# Patient Record
Sex: FEMALE | Race: WHITE
[De-identification: names, ages, dates, MRNs, and addresses within clinical notes are randomized per-mention and may not be internally consistent; named-entity substitution may affect disease eponyms.]

---

## 2017-05-25 ENCOUNTER — RESULT REVIEW (OUTPATIENT)
Age: 22
End: 2017-05-25

## 2018-05-29 ENCOUNTER — EMERGENCY (EMERGENCY)
Facility: HOSPITAL | Age: 23
LOS: 1 days | Discharge: HOME | End: 2018-05-29
Attending: EMERGENCY MEDICINE | Admitting: EMERGENCY MEDICINE
Payer: COMMERCIAL

## 2018-05-29 VITALS
OXYGEN SATURATION: 100 % | DIASTOLIC BLOOD PRESSURE: 78 MMHG | TEMPERATURE: 99 F | RESPIRATION RATE: 16 BRPM | HEART RATE: 76 BPM | SYSTOLIC BLOOD PRESSURE: 124 MMHG

## 2018-05-29 VITALS
SYSTOLIC BLOOD PRESSURE: 120 MMHG | HEART RATE: 108 BPM | OXYGEN SATURATION: 99 % | RESPIRATION RATE: 18 BRPM | TEMPERATURE: 99 F | DIASTOLIC BLOOD PRESSURE: 72 MMHG

## 2018-05-29 LAB
ALBUMIN SERPL ELPH-MCNC: 4 G/DL — SIGNIFICANT CHANGE UP (ref 3.3–5)
ALP SERPL-CCNC: 62 U/L — SIGNIFICANT CHANGE UP (ref 40–120)
ALT FLD-CCNC: 13 U/L — SIGNIFICANT CHANGE UP (ref 4–33)
ANISOCYTOSIS BLD QL: SLIGHT — SIGNIFICANT CHANGE UP
APPEARANCE UR: SIGNIFICANT CHANGE UP
AST SERPL-CCNC: 15 U/L — SIGNIFICANT CHANGE UP (ref 4–32)
BACTERIA # UR AUTO: SIGNIFICANT CHANGE UP
BASE EXCESS BLDV CALC-SCNC: 2.5 MMOL/L — SIGNIFICANT CHANGE UP
BASOPHILS # BLD AUTO: 0.01 K/UL — SIGNIFICANT CHANGE UP (ref 0–0.2)
BASOPHILS NFR BLD AUTO: 0.1 % — SIGNIFICANT CHANGE UP (ref 0–2)
BASOPHILS NFR SPEC: 0 % — SIGNIFICANT CHANGE UP (ref 0–2)
BILIRUB SERPL-MCNC: 0.5 MG/DL — SIGNIFICANT CHANGE UP (ref 0.2–1.2)
BILIRUB UR-MCNC: NEGATIVE — SIGNIFICANT CHANGE UP
BLOOD GAS VENOUS - CREATININE: 0.63 MG/DL — SIGNIFICANT CHANGE UP (ref 0.5–1.3)
BLOOD UR QL VISUAL: HIGH
BUN SERPL-MCNC: 5 MG/DL — LOW (ref 7–23)
CALCIUM SERPL-MCNC: 8.7 MG/DL — SIGNIFICANT CHANGE UP (ref 8.4–10.5)
CHLORIDE BLDV-SCNC: 102 MMOL/L — SIGNIFICANT CHANGE UP (ref 96–108)
CHLORIDE SERPL-SCNC: 95 MMOL/L — LOW (ref 98–107)
CO2 SERPL-SCNC: 23 MMOL/L — SIGNIFICANT CHANGE UP (ref 22–31)
COLOR SPEC: YELLOW — SIGNIFICANT CHANGE UP
CREAT SERPL-MCNC: 0.78 MG/DL — SIGNIFICANT CHANGE UP (ref 0.5–1.3)
EOSINOPHIL # BLD AUTO: 0.01 K/UL — SIGNIFICANT CHANGE UP (ref 0–0.5)
EOSINOPHIL NFR BLD AUTO: 0.1 % — SIGNIFICANT CHANGE UP (ref 0–6)
EOSINOPHIL NFR FLD: 0 % — SIGNIFICANT CHANGE UP (ref 0–6)
GAS PNL BLDV: 131 MMOL/L — LOW (ref 136–146)
GIANT PLATELETS BLD QL SMEAR: PRESENT — SIGNIFICANT CHANGE UP
GLUCOSE BLDV-MCNC: 117 — HIGH (ref 70–99)
GLUCOSE SERPL-MCNC: 114 MG/DL — HIGH (ref 70–99)
GLUCOSE UR-MCNC: NEGATIVE — SIGNIFICANT CHANGE UP
HCO3 BLDV-SCNC: 25 MMOL/L — SIGNIFICANT CHANGE UP (ref 20–27)
HCT VFR BLD CALC: 41.4 % — SIGNIFICANT CHANGE UP (ref 34.5–45)
HCT VFR BLDV CALC: 46.4 % — HIGH (ref 34.5–45)
HGB BLD-MCNC: 14 G/DL — SIGNIFICANT CHANGE UP (ref 11.5–15.5)
HGB BLDV-MCNC: 15.1 G/DL — SIGNIFICANT CHANGE UP (ref 11.5–15.5)
IMM GRANULOCYTES # BLD AUTO: 0.04 # — SIGNIFICANT CHANGE UP
IMM GRANULOCYTES NFR BLD AUTO: 0.5 % — SIGNIFICANT CHANGE UP (ref 0–1.5)
KETONES UR-MCNC: NEGATIVE — SIGNIFICANT CHANGE UP
LACTATE BLDV-MCNC: 1.5 MMOL/L — SIGNIFICANT CHANGE UP (ref 0.5–2)
LEUKOCYTE ESTERASE UR-ACNC: NEGATIVE — SIGNIFICANT CHANGE UP
LYMPHOCYTES # BLD AUTO: 0.69 K/UL — LOW (ref 1–3.3)
LYMPHOCYTES # BLD AUTO: 8.4 % — LOW (ref 13–44)
LYMPHOCYTES NFR SPEC AUTO: 6.4 % — LOW (ref 13–44)
MACROCYTES BLD QL: SLIGHT — SIGNIFICANT CHANGE UP
MCHC RBC-ENTMCNC: 31 PG — SIGNIFICANT CHANGE UP (ref 27–34)
MCHC RBC-ENTMCNC: 33.8 % — SIGNIFICANT CHANGE UP (ref 32–36)
MCV RBC AUTO: 91.6 FL — SIGNIFICANT CHANGE UP (ref 80–100)
MONOCYTES # BLD AUTO: 0.62 K/UL — SIGNIFICANT CHANGE UP (ref 0–0.9)
MONOCYTES NFR BLD AUTO: 7.6 % — SIGNIFICANT CHANGE UP (ref 2–14)
MONOCYTES NFR BLD: 7.3 % — SIGNIFICANT CHANGE UP (ref 2–9)
MUCOUS THREADS # UR AUTO: SIGNIFICANT CHANGE UP
NEUTROPHIL AB SER-ACNC: 77.1 % — HIGH (ref 43–77)
NEUTROPHILS # BLD AUTO: 6.83 K/UL — SIGNIFICANT CHANGE UP (ref 1.8–7.4)
NEUTROPHILS NFR BLD AUTO: 83.3 % — HIGH (ref 43–77)
NEUTS BAND # BLD: 8.3 % — HIGH (ref 0–6)
NITRITE UR-MCNC: NEGATIVE — SIGNIFICANT CHANGE UP
NON-SQ EPI CELLS # UR AUTO: <1 — SIGNIFICANT CHANGE UP
NRBC # FLD: 0 — SIGNIFICANT CHANGE UP
PCO2 BLDV: 44 MMHG — SIGNIFICANT CHANGE UP (ref 41–51)
PH BLDV: 7.4 PH — SIGNIFICANT CHANGE UP (ref 7.32–7.43)
PH UR: 6 — SIGNIFICANT CHANGE UP (ref 4.6–8)
PLATELET # BLD AUTO: 228 K/UL — SIGNIFICANT CHANGE UP (ref 150–400)
PLATELET COUNT - ESTIMATE: NORMAL — SIGNIFICANT CHANGE UP
PMV BLD: 10 FL — SIGNIFICANT CHANGE UP (ref 7–13)
PO2 BLDV: < 24 MMHG — LOW (ref 35–40)
POTASSIUM BLDV-SCNC: 3.8 MMOL/L — SIGNIFICANT CHANGE UP (ref 3.4–4.5)
POTASSIUM SERPL-MCNC: 4.1 MMOL/L — SIGNIFICANT CHANGE UP (ref 3.5–5.3)
POTASSIUM SERPL-SCNC: 4.1 MMOL/L — SIGNIFICANT CHANGE UP (ref 3.5–5.3)
PROT SERPL-MCNC: 7.4 G/DL — SIGNIFICANT CHANGE UP (ref 6–8.3)
PROT UR-MCNC: 20 MG/DL — SIGNIFICANT CHANGE UP
RBC # BLD: 4.52 M/UL — SIGNIFICANT CHANGE UP (ref 3.8–5.2)
RBC # FLD: 11.8 % — SIGNIFICANT CHANGE UP (ref 10.3–14.5)
RBC CASTS # UR COMP ASSIST: SIGNIFICANT CHANGE UP (ref 0–?)
REVIEW TO FOLLOW: YES — SIGNIFICANT CHANGE UP
SAO2 % BLDV: 27.4 % — LOW (ref 60–85)
SODIUM SERPL-SCNC: 132 MMOL/L — LOW (ref 135–145)
SP GR SPEC: 1.01 — SIGNIFICANT CHANGE UP (ref 1–1.04)
SQUAMOUS # UR AUTO: SIGNIFICANT CHANGE UP
UROBILINOGEN FLD QL: NORMAL MG/DL — SIGNIFICANT CHANGE UP
VARIANT LYMPHS # BLD: 0.9 % — SIGNIFICANT CHANGE UP
WBC # BLD: 8.2 K/UL — SIGNIFICANT CHANGE UP (ref 3.8–10.5)
WBC # FLD AUTO: 8.2 K/UL — SIGNIFICANT CHANGE UP (ref 3.8–10.5)
WBC UR QL: HIGH (ref 0–?)

## 2018-05-29 PROCEDURE — 93010 ELECTROCARDIOGRAM REPORT: CPT

## 2018-05-29 PROCEDURE — 99285 EMERGENCY DEPT VISIT HI MDM: CPT | Mod: 25

## 2018-05-29 RX ORDER — SODIUM CHLORIDE 9 MG/ML
2000 INJECTION INTRAMUSCULAR; INTRAVENOUS; SUBCUTANEOUS ONCE
Qty: 0 | Refills: 0 | Status: COMPLETED | OUTPATIENT
Start: 2018-05-29 | End: 2018-05-29

## 2018-05-29 RX ORDER — ONDANSETRON 8 MG/1
4 TABLET, FILM COATED ORAL ONCE
Qty: 0 | Refills: 0 | Status: COMPLETED | OUTPATIENT
Start: 2018-05-29 | End: 2018-05-29

## 2018-05-29 RX ADMIN — ONDANSETRON 4 MILLIGRAM(S): 8 TABLET, FILM COATED ORAL at 10:15

## 2018-05-29 RX ADMIN — SODIUM CHLORIDE 2000 MILLILITER(S): 9 INJECTION INTRAMUSCULAR; INTRAVENOUS; SUBCUTANEOUS at 09:24

## 2018-05-29 NOTE — ED PROVIDER NOTE - CARE PLAN
Principal Discharge DX:	Syncope  Assessment and plan of treatment:	1) Please follow-up with your primary care doctor within the next 3 days.  If you cannot follow-up with your doctor(s), please return to the ED for any urgent issues.  2) If you have any worsening of symptoms or any other concerns please return to the ED immediately.  3) Please continue taking your home medications as directed.  4) You may have been given a copy of your labs and/or imaging.  Please go over these with your primary care doctor.

## 2018-05-29 NOTE — ED ADULT TRIAGE NOTE - CHIEF COMPLAINT QUOTE
Recently returned from \[facility]\"" on May 22 and started experiencing nausea, diarrhea and abdominal pain.  Seen at urgent care on Wednesday and given antibiotics without relief.  She felt dizzy this morning and had a syncopal episode.  Patient felt dizzy and she remember waking up on the floor.  Bruise noted to forehead.

## 2018-05-29 NOTE — ED PROVIDER NOTE - PLAN OF CARE
1) Please follow-up with your primary care doctor within the next 3 days.  If you cannot follow-up with your doctor(s), please return to the ED for any urgent issues.  2) If you have any worsening of symptoms or any other concerns please return to the ED immediately.  3) Please continue taking your home medications as directed.  4) You may have been given a copy of your labs and/or imaging.  Please go over these with your primary care doctor.

## 2018-05-29 NOTE — ED PROVIDER NOTE - MEDICAL DECISION MAKING DETAILS
22F otherwise healthy s/p syncopal episode this AM. Well-appearing, neuro intact, not able to appreciate any signs of trauma. No hemotympanum, no carr sign. Syncope likely 2/2 dehydration vs vasovagal, will obtain labs, hydrate, treat nausea, check hcg, ekg.

## 2018-05-29 NOTE — ED PROVIDER NOTE - PROGRESS NOTE DETAILS
Spoke to pt and father about risks/benefit of cat scanning her head. Explained given her mechanism and perfect neuro exam, explained scan is likely low utility. Both pt and daughter have expressed their understanding. Spoke to pt and father about risks/benefit of cat scanning her head. Explained given her mechanism and neuro exam, that the statistics show obtaining a cat scan is of low utility. Also explained that there is no way that without the cat scan that we can with 100%certainty rule out a bleed. Both pt and daughter expressed their understanding and have refused a CT scan. Patient feeling better. ED workup negative for acute findings. Patient and family asking to go home. Will followup primary care.

## 2018-05-29 NOTE — ED PROVIDER NOTE - ATTENDING CONTRIBUTION TO CARE
22F presents with family from home for recent diarrhea s/p returning from [facility]. Today felt dehydrated and fainted, small bruise to forehead. Baseline mental status currently per family with no acute pain. Patient denies headache, vision changes, focal weakness/numbness, neck pain, fever, cough, chest pain, shortness of breath, back pain, abd pain, vomiting, constipation, blood in stool, dysuria, rash. Patient is well appearing, conversant, cooperative, smiling, neck supple with full range of motion, oropharynx clear, lungs clear, no crackles or rales, speaking full sentences, heart clear, no murmurs, distal pulses equal in all 4 extremities, abdomen soft nontender nondistended with no masses, no leg edema, no calf tenderness, nonfocal neurologic exam, CN 2-12 intact, ambulating, cerebellar intact finger to nose, vision intact.    IVF for hydration. Screening labs to check electrolytes. Screening EKG. UA, UCG. Had lengthy discussion with patient and family on risks/benefits of head CT; patient and family declined head CT examination. Reassess.

## 2018-05-29 NOTE — ED ADULT NURSE NOTE - CHIEF COMPLAINT QUOTE
Recently returned from [facility] on May 22 and started experiencing nausea, diarrhea and abdominal pain.  Seen at urgent care on Wednesday and given antibiotics without relief.  She felt dizzy this morning and had a syncopal episode.  Patient felt dizzy and she remember waking up on the floor.  Bruise noted to forehead.

## 2018-05-29 NOTE — ED PROVIDER NOTE - OBJECTIVE STATEMENT
22F no medical problems, on birth control, here s/p syncopal episode that occurred this AM ~4am while pt was up using the bathroom. Pt is not sure for how long she experienced LOC. When she came to she noticed that the front of her head and lower back were sore. Prior to the fall she felt palpitations. Pt has experienced syncope one other time in her life a few years back during a stressful, under slept week. Of note, pt got back from a family trip to [facility] ~1 week ago and upon arriving home her and family members came down with diarrhea and subjective f/c. Pt went to an urgent care earlier in the week for symptoms, was prescribed 3 day course of cipro. She thinks it helped initially but after going away for the weekend her symptoms came back yesterday.

## 2018-05-29 NOTE — ED ADULT NURSE NOTE - OBJECTIVE STATEMENT
pt brought to rm 24, A&Ox3, skin w/d/i, amb @ baseline c/o N/V/D x approx 1 week, recent travel to [place], onset of symptoms 2 days post return, presented to urgi-center w/ ABX (unknown) x 3 days w/ resolution of symptoms, symptoms returned 2 days post ABX, unable to tolerate PO since onset, today had 1 BM described as "very loose" followed y 1 syncopal episode, denies past cardiac history, appears in NAD @ this time, continued dizziness as per pt, NSR on CM, VS as  noted, SL placed, labs sent, awaiting MD burgos and results, report to Gosia BRIDGES.  (fac) pt brought to rm 24, A&Ox3, skin w/d/i, amb @ baseline c/o N/V/D x approx 1 week, recent travel to [place], onset of symptoms 2 days post return, presented to urgi-center w/ ABX (unknown) x 3 days w/ resolution of symptoms, symptoms returned 2 days post ABX, unable to tolerate PO since onset, today had 1 BM described as "very loose" followed y 1 syncopal episode, denies past cardiac history, appears in NAD @ this time, continued dizziness as per pt, NSR on CM, VS as  noted, SL placed, labs sent, awaiting MD burgos and results, report to Gosia BRIDGES.  (fac)  Patient discharged and left with family. DC instructions given and patient verbalizes understanding of instructions.  CYRUS Ambrocio